# Patient Record
Sex: FEMALE | Race: WHITE
[De-identification: names, ages, dates, MRNs, and addresses within clinical notes are randomized per-mention and may not be internally consistent; named-entity substitution may affect disease eponyms.]

---

## 2021-04-04 ENCOUNTER — HOSPITAL ENCOUNTER (EMERGENCY)
Dept: HOSPITAL 53 - M ED | Age: 21
Discharge: HOME | End: 2021-04-04
Payer: COMMERCIAL

## 2021-04-04 VITALS — DIASTOLIC BLOOD PRESSURE: 66 MMHG | SYSTOLIC BLOOD PRESSURE: 116 MMHG

## 2021-04-04 VITALS — WEIGHT: 141.98 LBS | HEIGHT: 70 IN | BODY MASS INDEX: 20.33 KG/M2

## 2021-04-04 DIAGNOSIS — R51.9: ICD-10-CM

## 2021-04-04 DIAGNOSIS — F41.9: ICD-10-CM

## 2021-04-04 DIAGNOSIS — I95.1: Primary | ICD-10-CM

## 2021-04-04 DIAGNOSIS — K58.9: ICD-10-CM

## 2021-04-04 DIAGNOSIS — N80.9: ICD-10-CM

## 2021-04-04 DIAGNOSIS — F33.9: ICD-10-CM

## 2021-04-04 DIAGNOSIS — H61.22: ICD-10-CM

## 2021-04-04 LAB
ALBUMIN SERPL BCG-MCNC: 4.2 GM/DL (ref 3.2–5.2)
ALT SERPL W P-5'-P-CCNC: 14 U/L (ref 12–78)
B-HCG SERPL QL: NEGATIVE
BASOPHILS # BLD AUTO: 0.1 10^3/UL (ref 0–0.2)
BASOPHILS NFR BLD AUTO: 0.8 % (ref 0–1)
BILIRUB SERPL-MCNC: 0.7 MG/DL (ref 0.2–1)
BUN SERPL-MCNC: 12 MG/DL (ref 7–18)
CALCIUM SERPL-MCNC: 9 MG/DL (ref 8.5–10.1)
CHLORIDE SERPL-SCNC: 105 MEQ/L (ref 98–107)
CK MB CFR.DF SERPL CALC: 1.52
CK MB SERPL-MCNC: < 1 NG/ML (ref ?–3.6)
CK SERPL-CCNC: 66 U/L (ref 26–192)
CO2 SERPL-SCNC: 29 MEQ/L (ref 21–32)
CREAT SERPL-MCNC: 0.59 MG/DL (ref 0.55–1.3)
EOSINOPHIL # BLD AUTO: 0.1 10^3/UL (ref 0–0.5)
EOSINOPHIL NFR BLD AUTO: 1 % (ref 0–3)
GLUCOSE SERPL-MCNC: 74 MG/DL (ref 70–100)
HCT VFR BLD AUTO: 42.6 % (ref 36–47)
HETEROPH AB SER QL LA: NEGATIVE
HGB BLD-MCNC: 13.9 G/DL (ref 12–15.5)
LYMPHOCYTES # BLD AUTO: 2.1 10^3/UL (ref 1.5–5)
LYMPHOCYTES NFR BLD AUTO: 32.7 % (ref 24–44)
MCH RBC QN AUTO: 29.5 PG (ref 27–33)
MCHC RBC AUTO-ENTMCNC: 32.6 G/DL (ref 32–36.5)
MCV RBC AUTO: 90.4 FL (ref 80–96)
MONOCYTES # BLD AUTO: 0.4 10^3/UL (ref 0–0.8)
MONOCYTES NFR BLD AUTO: 5.9 % (ref 2–8)
NEUTROPHILS # BLD AUTO: 3.7 10^3/UL (ref 1.5–8.5)
NEUTROPHILS NFR BLD AUTO: 59.3 % (ref 36–66)
PLATELET # BLD AUTO: 235 10^3/UL (ref 150–450)
POTASSIUM SERPL-SCNC: 3.9 MEQ/L (ref 3.5–5.1)
PROT SERPL-MCNC: 7.5 GM/DL (ref 6.4–8.2)
RBC # BLD AUTO: 4.71 10^6/UL (ref 4–5.4)
RSV RNA NPH QL NAA+PROBE: NEGATIVE
SODIUM SERPL-SCNC: 140 MEQ/L (ref 136–145)
T4 FREE SERPL-MCNC: 0.86 NG/DL (ref 0.78–1.33)
TROPONIN I SERPL-MCNC: < 0.02 NG/ML (ref ?–0.1)
TSH SERPL DL<=0.005 MIU/L-ACNC: 0.56 UIU/ML (ref 0.46–3.98)
WBC # BLD AUTO: 6.3 10^3/UL (ref 4–10)

## 2021-04-04 NOTE — ECGEPIP
[facility] - ED

                                       

                                       Test Date:    2021

Pat Name:     CRUZ ECHEVARRIA           Department:   

Patient ID:   [id number]                 Room:         -

Gender:       Female                   Technician:   ROSALVA

:          2000               Requested By: GRAEME COBIAN PA-C

Order Number: [id number]     Reading MD:   Maja Lundborg-Gray

                                 Measurements

Intervals                              Axis          

Rate:         68                       P:            14

GA:           116                      QRS:          82

QRSD:         100                      T:            34

QT:           384                                    

QTc:          408                                    

                           Interpretive Statements

Normal sinus rhythm with sinus arrhythmia

No prior ECG for comparison

Electronically Signed on 2021 19:20:19 EDT by Maja Lundborg-Gray

## 2021-04-04 NOTE — REPVR
PROCEDURE INFORMATION: 

Exam: MR Head Without Contrast 

Exam date and time: 4/4/2021 1:34 PM 

Age: 20 years old 

Clinical indication: Pain; Tension; Patient HX: Headaches, dizziness 



TECHNIQUE: 

Imaging protocol: MR of the head without contrast. 



COMPARISON: 

No relevant prior studies available. 



FINDINGS: 

Brain: Examination of the brain demonstrates normal morphology and signal 

intensity.No acute infarction, masses, midline shift or acute hemorrhage is 

seen. No acute intracranial abnormality is identified.There is no abnormal 

diffusion weighted signal intensity to suggest an acute ischemic event.The 

cortical grey / white matter interfaces are preserved throughout the 

brain.Intracranial flow voids are well maintained. 

Cerebral ventricles: The ventricular system is not dilated and is appropriate 

for the patient's age. 

Bones/joints: Unremarkable. 

Paranasal sinuses: Normal as visualized. No acute sinusitis. 

Mastoid air cells: Normal as visualized. No mastoid effusion. 

Orbital cavity: Unremarkable. 

Soft tissues: Unremarkable. 



IMPRESSION: 

No acute infarction, masses or hemorrhage is seen. No acute intracranial 

abnormality is identified. 



Electronically signed by: Jason Zuniga On 04/04/2021  13:51:34 PM

## 2021-04-05 LAB — VIT B12 SERPL-MCNC: 444 PG/ML (ref 247–911)

## 2021-04-06 LAB
B BURGDOR IGG+IGM SER-ACNC: <0.91 ISR (ref 0–0.9)
B BURGDOR IGM SER IA-ACNC: <0.8 INDEX (ref 0–0.79)